# Patient Record
Sex: MALE | Race: WHITE
[De-identification: names, ages, dates, MRNs, and addresses within clinical notes are randomized per-mention and may not be internally consistent; named-entity substitution may affect disease eponyms.]

---

## 2019-12-12 ENCOUNTER — HOSPITAL ENCOUNTER (EMERGENCY)
Dept: HOSPITAL 47 - EC | Age: 29
Discharge: HOME | End: 2019-12-12
Payer: COMMERCIAL

## 2019-12-12 VITALS — RESPIRATION RATE: 18 BRPM | SYSTOLIC BLOOD PRESSURE: 120 MMHG | HEART RATE: 60 BPM | DIASTOLIC BLOOD PRESSURE: 82 MMHG

## 2019-12-12 VITALS — TEMPERATURE: 97.8 F

## 2019-12-12 DIAGNOSIS — R09.89: Primary | ICD-10-CM

## 2019-12-12 PROCEDURE — 99285 EMERGENCY DEPT VISIT HI MDM: CPT

## 2019-12-12 PROCEDURE — 87502 INFLUENZA DNA AMP PROBE: CPT

## 2019-12-12 PROCEDURE — 71046 X-RAY EXAM CHEST 2 VIEWS: CPT

## 2019-12-12 NOTE — XR
EXAMINATION TYPE: XR chest 2V

 

DATE OF EXAM: 12/12/2019

 

COMPARISON: May 2, 2009

 

HISTORY: Aspiration

 

TECHNIQUE: 2 views

 

FINDINGS: There is some thoracic kyphotic deformity. There is no definite heart failure nor confluent
 pneumonic infiltrate. Heart size appears normal. There is no pleural effusion. Exam is limited by lo
rdotic positioning.

 

IMPRESSION: Limited exam. No active cardiopulmonary disease.

## 2019-12-12 NOTE — ED
General Adult HPI





- General


Chief complaint: Recheck/Abnormal Lab/Rx


Stated complaint: Altered Mental Status


Time Seen by Provider: 12/12/19 22:06


Source: EMS


Mode of arrival: EMS


Limitations: no limitations





- History of Present Illness


Initial comments: 





Patient is a 29-year-old male with history of quadriplegic cerebral palsy 

presenting to the emergency department with a chief complaint of a possible 

choking episode.  Mother reports the patient typically takes trazodone prior to 

going to bed.  Today he took the trazodone about 2 hours prior to ED arrival 

with male.  Mother reports about an hour after the patient had developed a 

gagging episode and turned pale for about 3-5 minutes.  Afterwards mom states 

she lifted the patient up and he vomited all the male.  Mother reports the 

patient had gradually returned back to his baseline.  Currently he is at his 

baseline.  Mother reports no coughing or signs of gagging afterwards.  Mother 

did not give the patient any food or drinks after that incident.





- Related Data


                                    Allergies











Allergy/AdvReac Type Severity Reaction Status Date / Time


 


No Known Allergies Allergy   Verified 12/12/19 22:05














Review of Systems


ROS Statement: 


Those systems with pertinent positive or pertinent negative responses have been 

documented in the HPI.





ROS Other: All systems not noted in ROS Statement are negative.





Past Medical History


Additional Past Medical History / Comment(s): CP


History of Any Multi-Drug Resistant Organisms: None Reported


Past Surgical History: Orthopedic Surgery


Past Psychological History: No Psychological Hx Reported


Smoking Status: Never smoker


Past Alcohol Use History: None Reported


Past Drug Use History: None Reported





General Exam


Limitations: no limitations


General appearance: alert, in no apparent distress


Head exam: Present: atraumatic, normal inspection


Eye exam: Present: normal appearance


Pupils: Present: normal accommodation


ENT exam: Present: normal exam, normal oropharynx, mucous membranes moist


Neck exam: Present: normal inspection, full ROM


Respiratory exam: Present: normal lung sounds bilaterally.  Absent: respiratory 

distress, wheezes, rales


Cardiovascular Exam: Present: regular rate, normal rhythm, normal heart sounds


Extremities exam: Present: normal inspection, normal capillary refill


Back exam: Present: normal inspection


Neurological exam: Present: alert


Psychiatric exam: Present: normal affect, normal mood


Skin exam: Present: warm, dry, intact, normal color





Course


                                   Vital Signs











  12/12/19 12/12/19





  21:58 23:49


 


Temperature 97.8 F 97.8 F


 


Pulse Rate 56 L 60


 


Respiratory 16 18





Rate  


 


Blood Pressure 142/92 120/82


 


O2 Sat by Pulse 96 97





Oximetry  














Medical Decision Making





- Medical Decision Making





Patient is a 29-year-old male, quadriplegic cerebral palsy presenting to the 

emergency department with a chief complaint of a possible choking episode.  The 

patient had turned pale for about 3-5 minutes with an episode of gagging about 

one hour prior to eating.  Once the patient was lifted he vomited and has return

to his baseline afterwards.  On exam no signs of respiratory distress.  Patient 

is not coughing at this time.  I concern for possible aspiration pneumonia.  

Patient appears at his baseline right now according to the mother.  Chest x-ray 

and flu are unremarkable.  Mother advised to return to emergency department if 

the patient begins to cough and develops a fever.  I suspect the patient has 

suffered a choking episode because after he was able to vomit, he returned to 

baseline and the pallor resolved.  Dr. Darling also examined the patient and is 

in agreement with the treatment plan.  Mother advised to follow with primary 

care.  Strict return parameters were thoroughly discussed mother was 

understanding and agreeable.





- Lab Data


                                   Lab Results











  12/12/19 Range/Units





  22:42 


 


Influenza Type A RNA  Not Detected  (Not Detectd)  


 


Influenza Type B (PCR)  Not Detected  (Not Detectd)  














Disposition


Clinical Impression: 


 Choking episode





Disposition: HOME SELF-CARE


Condition: Stable


Instructions (If sedation given, give patient instructions):  Esophageal Foreign

Body (ED)


Additional Instructions: 


Please follow up with primary care.  Please return to emergency department if 

symptoms worsen.


Is patient prescribed a controlled substance at d/c from ED?: No


Referrals: 


Justin Lindquist DO [Primary Care Provider] - 1-2 days


Time of Disposition: 23:23

## 2020-07-02 ENCOUNTER — HOSPITAL ENCOUNTER (EMERGENCY)
Dept: HOSPITAL 47 - EC | Age: 30
LOS: 1 days | Discharge: HOME | End: 2020-07-03
Payer: COMMERCIAL

## 2020-07-02 DIAGNOSIS — Z86.69: ICD-10-CM

## 2020-07-02 DIAGNOSIS — R55: Primary | ICD-10-CM

## 2020-07-02 LAB
ALBUMIN SERPL-MCNC: 4.7 G/DL (ref 3.5–5)
ALP SERPL-CCNC: 89 U/L (ref 38–126)
ALT SERPL-CCNC: 20 U/L (ref 4–49)
ANION GAP SERPL CALC-SCNC: 10 MMOL/L
APTT BLD: 24.9 SEC (ref 22–30)
AST SERPL-CCNC: 28 U/L (ref 17–59)
BASOPHILS # BLD AUTO: 0 K/UL (ref 0–0.2)
BASOPHILS NFR BLD AUTO: 0 %
BUN SERPL-SCNC: 9 MG/DL (ref 9–20)
CALCIUM SPEC-MCNC: 10 MG/DL (ref 8.4–10.2)
CHLORIDE SERPL-SCNC: 99 MMOL/L (ref 98–107)
CO2 SERPL-SCNC: 29 MMOL/L (ref 22–30)
EOSINOPHIL # BLD AUTO: 0.1 K/UL (ref 0–0.7)
EOSINOPHIL NFR BLD AUTO: 1 %
ERYTHROCYTE [DISTWIDTH] IN BLOOD BY AUTOMATED COUNT: 5.68 M/UL (ref 4.3–5.9)
ERYTHROCYTE [DISTWIDTH] IN BLOOD: 12.1 % (ref 11.5–15.5)
GLUCOSE SERPL-MCNC: 94 MG/DL (ref 74–99)
HCT VFR BLD AUTO: 52.3 % (ref 39–53)
HGB BLD-MCNC: 17.8 GM/DL (ref 13–17.5)
INR PPP: 1 (ref ?–1.2)
LYMPHOCYTES # SPEC AUTO: 1.5 K/UL (ref 1–4.8)
LYMPHOCYTES NFR SPEC AUTO: 17 %
MAGNESIUM SPEC-SCNC: 1.8 MG/DL (ref 1.6–2.3)
MCH RBC QN AUTO: 31.4 PG (ref 25–35)
MCHC RBC AUTO-ENTMCNC: 34.1 G/DL (ref 31–37)
MCV RBC AUTO: 92.1 FL (ref 80–100)
MONOCYTES # BLD AUTO: 0.4 K/UL (ref 0–1)
MONOCYTES NFR BLD AUTO: 5 %
NEUTROPHILS # BLD AUTO: 6.7 K/UL (ref 1.3–7.7)
NEUTROPHILS NFR BLD AUTO: 76 %
PH UR: 7 [PH] (ref 5–8)
PLATELET # BLD AUTO: 253 K/UL (ref 150–450)
POTASSIUM SERPL-SCNC: 4.5 MMOL/L (ref 3.5–5.1)
PROT SERPL-MCNC: 8.5 G/DL (ref 6.3–8.2)
PT BLD: 10.3 SEC (ref 9–12)
SODIUM SERPL-SCNC: 138 MMOL/L (ref 137–145)
SP GR UR: 1.01 (ref 1–1.03)
UROBILINOGEN UR QL STRIP: <2 MG/DL (ref ?–2)
WBC # BLD AUTO: 8.7 K/UL (ref 3.8–10.6)

## 2020-07-02 PROCEDURE — 83735 ASSAY OF MAGNESIUM: CPT

## 2020-07-02 PROCEDURE — 85610 PROTHROMBIN TIME: CPT

## 2020-07-02 PROCEDURE — 81003 URINALYSIS AUTO W/O SCOPE: CPT

## 2020-07-02 PROCEDURE — 84484 ASSAY OF TROPONIN QUANT: CPT

## 2020-07-02 PROCEDURE — 85025 COMPLETE CBC W/AUTO DIFF WBC: CPT

## 2020-07-02 PROCEDURE — 36415 COLL VENOUS BLD VENIPUNCTURE: CPT

## 2020-07-02 PROCEDURE — 71045 X-RAY EXAM CHEST 1 VIEW: CPT

## 2020-07-02 PROCEDURE — 99284 EMERGENCY DEPT VISIT MOD MDM: CPT

## 2020-07-02 PROCEDURE — 85730 THROMBOPLASTIN TIME PARTIAL: CPT

## 2020-07-02 PROCEDURE — 93005 ELECTROCARDIOGRAM TRACING: CPT

## 2020-07-02 PROCEDURE — 80053 COMPREHEN METABOLIC PANEL: CPT

## 2020-07-02 NOTE — XR
EXAMINATION TYPE: XR chest 1V

 

DATE OF EXAM: 7/2/2020

 

COMPARISON: 12/12/2019

 

HISTORY: Syncope

 

TECHNIQUE:

 

FINDINGS: There is poor inspiration with elevation of the right diaphragm. There is some thoracic kyp
hotic deformity. There is no pleural effusion. There is some thoracolumbar levoscoliotic deformity. T
here appears to be some infiltrate and atelectasis medial aspect right upper lobe similar to old exam
. Heart size is normal.

 

 

 

IMPRESSION: There is some chronic infiltrate or atelectasis right upper lobe similar to old exam. No 
heart failure seen. Chronic right diaphragm elevation unchanged.

## 2020-07-02 NOTE — ED
General Adult HPI





- General


Chief complaint: Syncope


Stated complaint: Syncope


Time Seen by Provider: 07/02/20 21:22


Source: family


Mode of arrival: wheelchair


Limitations: altered mental status, physical limitation





- History of Present Illness


Initial comments: 





Patient is a 29-year-old male with history of quadriplegic cerebral palsy, 

nonverbal presenting to emergency Department with chief complaint of passing 

out.  The caregiver states she was given the patient a shower when he suddenly 

"felt limp" and caused him to fall forward.  He states he "passed out" for 

approximately 5 minutes with no apparent postictal state.  She denies any 

repetitive myoclonic or tonic clonic movements.  Mother states there is no 

previous history of syncopal episodes or seizures.  Caregiver states there is no

episodes of vomiting afterwards.  Mother and caregiver state the patient is 

otherwise acting at his baseline.  Caregiver denies any change of color in the a

pparent syncopal episode.  She denies any trauma after patient had a syncopal 

episode.





- Related Data


                                Home Medications











 Medication  Instructions  Recorded  Confirmed


 


Cetirizine HCl [Zyrtec] 10 mg PO HS 07/02/20 07/02/20


 


Glycopyrrolate [Robinul] 1 mg PO BID 07/02/20 07/02/20


 


Omeprazole 20 mg PO HS 07/02/20 07/02/20


 


traZODone  mg PO HS 07/02/20 07/02/20











                                    Allergies











Allergy/AdvReac Type Severity Reaction Status Date / Time


 


No Known Allergies Allergy   Verified 07/02/20 23:30














Review of Systems


ROS Statement: 


Those systems with pertinent positive or pertinent negative responses have been 

documented in the HPI.





ROS Other: All systems not noted in ROS Statement are negative.





Past Medical History


Additional Past Medical History / Comment(s): CP


History of Any Multi-Drug Resistant Organisms: None Reported


Past Surgical History: Orthopedic Surgery


Past Psychological History: No Psychological Hx Reported


Smoking Status: Never smoker


Past Alcohol Use History: None Reported


Past Drug Use History: None Reported





General Exam


Limitations: altered mental status, physical limitation


General appearance: alert, in no apparent distress


Head exam: Present: atraumatic, normal inspection.  Absent: normocephalic


Eye exam: Present: normal appearance, PERRL, EOMI


Pupils: Present: normal accommodation


ENT exam: Present: normal exam, normal oropharynx, mucous membranes moist, TM's 

normal bilaterally, normal external ear exam


Neck exam: Present: normal inspection, full ROM.  Absent: tenderness


Respiratory exam: Present: normal lung sounds bilaterally.  Absent: respiratory 

distress, wheezes, rales


Cardiovascular Exam: Present: regular rate, normal rhythm, normal heart sounds


GI/Abdominal exam: Present: soft.  Absent: distended, tenderness


Extremities exam: Present: normal inspection, full ROM, normal capillary refill,

 other (+2 ulnar and radial pulses bilaterally.)


Back exam: Present: normal inspection, full ROM.  Absent: tenderness


Neurological exam: Present: alert


Psychiatric exam: Present: normal affect, normal mood


Skin exam: Present: warm, dry, intact, normal color





Course


                                   Vital Signs











  07/02/20 07/02/20 07/02/20





  21:00 21:28 22:59


 


Temperature 97.6 F  


 


Pulse Rate 52 L  62


 


Pulse Rate [  54 L 





Cardiac Monitor   





]   


 


Respiratory 18  16





Rate   


 


Blood Pressure 137/92  122/90


 


O2 Sat by Pulse 96  97





Oximetry   














  07/03/20





  00:13


 


Temperature 97.8 F


 


Pulse Rate 67


 


Pulse Rate [ 





Cardiac Monitor 





] 


 


Respiratory 18





Rate 


 


Blood Pressure 122/72


 


O2 Sat by Pulse 97





Oximetry 














EKG Findings





- EKG Comments:


EKG Findings:: Right axis deviation with ST elevation.  Suspecting early 

repolarization.  Ventricular rate 54, , QRS 78, .





Medical Decision Making





- Medical Decision Making





Patient is a 29-year-old male with history of quadriplegic cerebral palsy 

presenting to emergency Department with chief complaint of passing out.  On 

physical exam patient appears to be at baseline according to the mother and 

caregiver.  Chest x-ray reveals chronic infiltrate or atelectasis in the right 

upper lobe similar to old exam.  No heart failure seen.  Chronic right diaphragm

elevation and unchanged.  EKG did show early repolarization with ST elevations 

in V5 and V6.  I spoke with Dr. Rdz regarding the EKG.  He suggested a full 

cardiac workup.  CBC, CMP, coags unremarkable.  Negative troponin.  This appears

to be a syncopal episode and not a seizure.  Patient is to follow-up with the 

primary care within next few days.  Return parameters were thoroughly discussed 

with mother was understanding and agreeable.  Case discussed with physician.





- Lab Data


Result diagrams: 


                                 07/02/20 22:54





                                 07/02/20 22:54


                                   Lab Results











  07/02/20 07/02/20 07/02/20 Range/Units





  21:57 22:54 22:54 


 


WBC   8.7   (3.8-10.6)  k/uL


 


RBC   5.68   (4.30-5.90)  m/uL


 


Hgb   17.8 H   (13.0-17.5)  gm/dL


 


Hct   52.3   (39.0-53.0)  %


 


MCV   92.1   (80.0-100.0)  fL


 


MCH   31.4   (25.0-35.0)  pg


 


MCHC   34.1   (31.0-37.0)  g/dL


 


RDW   12.1   (11.5-15.5)  %


 


Plt Count   253   (150-450)  k/uL


 


Neutrophils %   76   %


 


Lymphocytes %   17   %


 


Monocytes %   5   %


 


Eosinophils %   1   %


 


Basophils %   0   %


 


Neutrophils #   6.7   (1.3-7.7)  k/uL


 


Lymphocytes #   1.5   (1.0-4.8)  k/uL


 


Monocytes #   0.4   (0-1.0)  k/uL


 


Eosinophils #   0.1   (0-0.7)  k/uL


 


Basophils #   0.0   (0-0.2)  k/uL


 


PT    10.3  (9.0-12.0)  sec


 


INR    1.0  (<1.2)  


 


APTT    24.9  (22.0-30.0)  sec


 


Sodium     (137-145)  mmol/L


 


Potassium     (3.5-5.1)  mmol/L


 


Chloride     ()  mmol/L


 


Carbon Dioxide     (22-30)  mmol/L


 


Anion Gap     mmol/L


 


BUN     (9-20)  mg/dL


 


Creatinine     (0.66-1.25)  mg/dL


 


Est GFR (CKD-EPI)AfAm     (>60 ml/min/1.73 sqM)  


 


Est GFR (CKD-EPI)NonAf     (>60 ml/min/1.73 sqM)  


 


Glucose     (74-99)  mg/dL


 


Calcium     (8.4-10.2)  mg/dL


 


Magnesium     (1.6-2.3)  mg/dL


 


Total Bilirubin     (0.2-1.3)  mg/dL


 


AST     (17-59)  U/L


 


ALT     (4-49)  U/L


 


Alkaline Phosphatase     ()  U/L


 


Troponin I     (0.000-0.034)  ng/mL


 


Total Protein     (6.3-8.2)  g/dL


 


Albumin     (3.5-5.0)  g/dL


 


Urine Color  Light Yellow    


 


Urine Appearance  Clear    (Clear)  


 


Urine pH  7.0    (5.0-8.0)  


 


Ur Specific Gravity  1.007    (1.001-1.035)  


 


Urine Protein  Negative    (Negative)  


 


Urine Glucose (UA)  Negative    (Negative)  


 


Urine Ketones  Negative    (Negative)  


 


Urine Blood  Negative    (Negative)  


 


Urine Nitrite  Negative    (Negative)  


 


Urine Bilirubin  Negative    (Negative)  


 


Urine Urobilinogen  <2.0    (<2.0)  mg/dL


 


Ur Leukocyte Esterase  Negative    (Negative)  














  07/02/20 07/02/20 Range/Units





  22:54 22:54 


 


WBC    (3.8-10.6)  k/uL


 


RBC    (4.30-5.90)  m/uL


 


Hgb    (13.0-17.5)  gm/dL


 


Hct    (39.0-53.0)  %


 


MCV    (80.0-100.0)  fL


 


MCH    (25.0-35.0)  pg


 


MCHC    (31.0-37.0)  g/dL


 


RDW    (11.5-15.5)  %


 


Plt Count    (150-450)  k/uL


 


Neutrophils %    %


 


Lymphocytes %    %


 


Monocytes %    %


 


Eosinophils %    %


 


Basophils %    %


 


Neutrophils #    (1.3-7.7)  k/uL


 


Lymphocytes #    (1.0-4.8)  k/uL


 


Monocytes #    (0-1.0)  k/uL


 


Eosinophils #    (0-0.7)  k/uL


 


Basophils #    (0-0.2)  k/uL


 


PT    (9.0-12.0)  sec


 


INR    (<1.2)  


 


APTT    (22.0-30.0)  sec


 


Sodium  138   (137-145)  mmol/L


 


Potassium  4.5   (3.5-5.1)  mmol/L


 


Chloride  99   ()  mmol/L


 


Carbon Dioxide  29   (22-30)  mmol/L


 


Anion Gap  10   mmol/L


 


BUN  9   (9-20)  mg/dL


 


Creatinine  0.64 L   (0.66-1.25)  mg/dL


 


Est GFR (CKD-EPI)AfAm  >90   (>60 ml/min/1.73 sqM)  


 


Est GFR (CKD-EPI)NonAf  >90   (>60 ml/min/1.73 sqM)  


 


Glucose  94   (74-99)  mg/dL


 


Calcium  10.0   (8.4-10.2)  mg/dL


 


Magnesium  1.8   (1.6-2.3)  mg/dL


 


Total Bilirubin  0.4   (0.2-1.3)  mg/dL


 


AST  28   (17-59)  U/L


 


ALT  20   (4-49)  U/L


 


Alkaline Phosphatase  89   ()  U/L


 


Troponin I   <0.012  (0.000-0.034)  ng/mL


 


Total Protein  8.5 H   (6.3-8.2)  g/dL


 


Albumin  4.7   (3.5-5.0)  g/dL


 


Urine Color    


 


Urine Appearance    (Clear)  


 


Urine pH    (5.0-8.0)  


 


Ur Specific Gravity    (1.001-1.035)  


 


Urine Protein    (Negative)  


 


Urine Glucose (UA)    (Negative)  


 


Urine Ketones    (Negative)  


 


Urine Blood    (Negative)  


 


Urine Nitrite    (Negative)  


 


Urine Bilirubin    (Negative)  


 


Urine Urobilinogen    (<2.0)  mg/dL


 


Ur Leukocyte Esterase    (Negative)  














Disposition


Clinical Impression: 


 Syncopal episodes





Disposition: HOME SELF-CARE


Condition: Stable


Instructions (If sedation given, give patient instructions):  Syncope (DC)


Additional Instructions: 


Follow-up with primary care.  Return to emergency department if symptoms worsen.


Is patient prescribed a controlled substance at d/c from ED?: No


Referrals: 


Justin Lindquist DO [Primary Care Provider] - 1-2 days


Time of Disposition: 23:45

## 2020-07-03 VITALS
HEART RATE: 67 BPM | RESPIRATION RATE: 18 BRPM | DIASTOLIC BLOOD PRESSURE: 72 MMHG | SYSTOLIC BLOOD PRESSURE: 122 MMHG | TEMPERATURE: 97.8 F